# Patient Record
Sex: FEMALE | Race: WHITE
[De-identification: names, ages, dates, MRNs, and addresses within clinical notes are randomized per-mention and may not be internally consistent; named-entity substitution may affect disease eponyms.]

---

## 2019-11-05 ENCOUNTER — HOSPITAL ENCOUNTER (EMERGENCY)
Dept: HOSPITAL 74 - JER | Age: 64
LOS: 1 days | Discharge: HOME | End: 2019-11-06
Payer: SELF-PAY

## 2019-11-05 VITALS — HEART RATE: 67 BPM | DIASTOLIC BLOOD PRESSURE: 85 MMHG | TEMPERATURE: 98.4 F | SYSTOLIC BLOOD PRESSURE: 136 MMHG

## 2019-11-05 VITALS — BODY MASS INDEX: 1.8 KG/M2

## 2019-11-05 DIAGNOSIS — F32.9: ICD-10-CM

## 2019-11-05 DIAGNOSIS — E11.9: ICD-10-CM

## 2019-11-05 DIAGNOSIS — Z79.4: ICD-10-CM

## 2019-11-05 DIAGNOSIS — E78.5: ICD-10-CM

## 2019-11-05 DIAGNOSIS — R22.1: Primary | ICD-10-CM

## 2019-11-05 DIAGNOSIS — E78.00: ICD-10-CM

## 2019-11-05 DIAGNOSIS — I10: ICD-10-CM

## 2019-11-05 LAB
ALBUMIN SERPL-MCNC: 4 G/DL (ref 3.4–5)
ALP SERPL-CCNC: 124 U/L (ref 45–117)
ALT SERPL-CCNC: 21 U/L (ref 13–61)
ANION GAP SERPL CALC-SCNC: 6 MMOL/L (ref 8–16)
AST SERPL-CCNC: 10 U/L (ref 15–37)
BASOPHILS # BLD: 1.5 % (ref 0–2)
BILIRUB SERPL-MCNC: 0.4 MG/DL (ref 0.2–1)
BUN SERPL-MCNC: 14.4 MG/DL (ref 7–18)
CALCIUM SERPL-MCNC: 9.4 MG/DL (ref 8.5–10.1)
CHLORIDE SERPL-SCNC: 109 MMOL/L (ref 98–107)
CO2 SERPL-SCNC: 27 MMOL/L (ref 21–32)
CREAT SERPL-MCNC: 0.8 MG/DL (ref 0.55–1.3)
DEPRECATED RDW RBC AUTO: 14 % (ref 11.6–15.6)
EOSINOPHIL # BLD: 2.5 % (ref 0–4.5)
GLUCOSE SERPL-MCNC: 251 MG/DL (ref 74–106)
HCT VFR BLD CALC: 42.5 % (ref 32.4–45.2)
HGB BLD-MCNC: 14.2 GM/DL (ref 10.7–15.3)
LYMPHOCYTES # BLD: 48 % (ref 8–40)
MCH RBC QN AUTO: 28.8 PG (ref 25.7–33.7)
MCHC RBC AUTO-ENTMCNC: 33.3 G/DL (ref 32–36)
MCV RBC: 86.5 FL (ref 80–96)
MONOCYTES # BLD AUTO: 4.9 % (ref 3.8–10.2)
NEUTROPHILS # BLD: 43.1 % (ref 42.8–82.8)
PLATELET # BLD AUTO: 322 K/MM3 (ref 134–434)
PMV BLD: 8.8 FL (ref 7.5–11.1)
POTASSIUM SERPLBLD-SCNC: 4.3 MMOL/L (ref 3.5–5.1)
PROT SERPL-MCNC: 8 G/DL (ref 6.4–8.2)
RBC # BLD AUTO: 4.92 M/MM3 (ref 3.6–5.2)
SODIUM SERPL-SCNC: 142 MMOL/L (ref 136–145)
WBC # BLD AUTO: 7.7 K/MM3 (ref 4–10)

## 2019-11-05 NOTE — PDOC
History of Present Illness





- General


Chief Complaint: Edema


Stated Complaint: NECK SWOLLEN


Time Seen by Provider: 11/05/19 20:20





- History of Present Illness


Initial Comments: 


64 year old female with PMH of HTN, IDDM, HLD, depression, and chronic right 

sided neck swelling presenting with worsening right sided neck swelling with 

feeling as if her sugar was elevated. She also seems to be running low on her 

insulin. She is originally from [place] but visiting her family for vacation 

and going back to [place] in December. No fevers, chills, nausea, vomiting, 

diarrhea, chest pain, dizziness, or other symptoms. 


11/05/19 22:37











Past History





- Past Medical History


Allergies/Adverse Reactions: 


 Allergies











Allergy/AdvReac Type Severity Reaction Status Date / Time


 


No Known Allergies Allergy   Verified 11/05/19 20:25











Home Medications: 


Ambulatory Orders





Insulin Aspart [Novolog] 100 unit SQ ASDIR PRN #1 pkt 11/06/19 


Insulin Glargine,Hum.rec.anlog [Basaglar Kwikpen U-100] 100 unit SQ ASDIR #1 

[email or web address] 11/06/19 








COPD: No


Diabetes: Yes (IDDM)


HTN: Yes


Hypercholesterolemia: Yes





- Psycho Social/Smoking Cessation Hx


Smoking History: Never smoked





**Review of Systems





- Review of Systems


Constitutional: No: Chills, Diaphoresis, Loss of Appetite


HEENTM: No: Blurred Vision, Tearing


Respiratory: No: Cough, Orthopnea, Shortness of Breath


Cardiac (ROS): No: Chest Pain, Edema, Irregular Heart Rate


ABD/GI: No: Diarrhea, Nausea, Rectal Bleeding, Vomiting


: No: Dysuria, Discharge, Frequency


Musculoskeletal: No: Back Pain, Gout, Joint Pain, Joint Swelling


Integumentary: Yes: Lumps


Neurological: No: Headache, Numbness, Paresthesia


Psychiatric: No: Anxiety, Depression, Frequent Crying


Hematologic/Lymphatic: No: Anemia, Blood Clots, Easy Bleeding





*Physical Exam





- Vital Signs


 Last Vital Signs











Temp Pulse Resp BP Pulse Ox


 


 98.4 F   67   20   136/85   98 


 


 11/05/19 20:25  11/05/19 20:25  11/05/19 20:25  11/05/19 20:25  11/05/19 20:25














- Physical Exam


General Appearance: Yes: Nourished, Appropriately Dressed.  No: Apparent 

Distress


HEENT: positive: EOMI, MARYANN, Normal ENT Inspection, Normal Voice


Neck: positive: Trachea midline, Normal Thyroid, Supple, Other (right sided 

fluctuant neck swellign without warmth, erythema, mass, pulse, or other 

abnromal findings).  negative: Tender, Rigid


Respiratory/Chest: positive: Lungs Clear, Normal Breath Sounds.  negative: 

Chest Tender, Respiratory Distress, Accessory Muscle Use


Cardiovascular: positive: Regular Rhythm, Regular Rate


Gastrointestinal/Abdominal: positive: Normal Bowel Sounds, Flat, Soft.  negative

: Tender





ED Treatment Course





- LABORATORY


CBC & Chemistry Diagram: 


 11/05/19 22:50





 11/05/19 22:50





Medical Decision Making





- Medical Decision Making


64 year old female with IDDM, HTN, HLD, and right sided neck swelling x 1 year 

presenting with worsening neck swelling and need to get her glucose checked. 

All labs wnl with glucose 250. No respiratory symptoms, or other concerning PE 

findings. Will refill insulin and send home with home country PCP follow up 

instructions. 


11/05/19 23:57





Discharge





- Discharge Information


Problems reviewed: Yes


Clinical Impression/Diagnosis: 


 Neck swelling





Condition: Improved


Disposition: HOME





- Admission


No





- Additional Discharge Information


Prescriptions: 


Insulin Aspart [Novolog] 100 unit SQ ASDIR PRN #1 pkt


 PRN Reason: hyperg


Insulin Glargine,Hum.rec.anlog [Basaglar Kwikpen U-100] 100 unit SQ ASDIR #1 

[email or web address]





- Follow up/Referral


Referrals: 


[facility] [place] [Provider Group]





- Patient Discharge Instructions


Patient Printed Discharge Instructions:  Type 2 Diabetes


Additional Instructions: 


Utilice bhandari insulina segn lo prescrito por bhandari otro mdico. Kat un seguimiento 

con bhandari mdico en [place] o programe atencin con nuestros mdicos si se 

hospeda en bita pas. Regrese al servicio de urgencias si tiene sntomas nuevos 

o que empeoran.


Print Language: Chadian





- Post Discharge Activity

## 2019-11-05 NOTE — PDOC
Attending Attestation





- Resident


Resident Name: Scotty Allen





- ED Attending Attestation


I have performed the following: I have examined & evaluated the patient, The 

case was reviewed & discussed with the resident, I agree w/resident's findings 

& plan





- HPI


HPI: 





11/05/19 23:34


see resident hpi





- Physicial Exam


PE: 





11/05/19 23:34


agree with resident exam





- Medical Decision Making





11/05/19 23:34


64-year-old female visiting from [place] with elevated blood sugar after 

running low on insulin


Blood glucose elevated at 251 with no secondary indicators of DKA


Patient received 1 L IV fluid normal saline


She will be given refills on her insulin pens which she does have on her person 

and be discharged with recommended outpatient follow-up


In regards to chronic right neck swelling, there is no palpable mass, there is 

no pulsatile aspect, area is nontender and airway is intact


Due to chronic nature recommended outpatient follow-up will be recommended as 

well